# Patient Record
Sex: FEMALE | Race: BLACK OR AFRICAN AMERICAN | Employment: STUDENT | URBAN - METROPOLITAN AREA
[De-identification: names, ages, dates, MRNs, and addresses within clinical notes are randomized per-mention and may not be internally consistent; named-entity substitution may affect disease eponyms.]

---

## 2023-10-27 ENCOUNTER — OFFICE VISIT (OUTPATIENT)
Dept: SURGERY | Facility: CLINIC | Age: 23
End: 2023-10-27

## 2023-10-27 VITALS
HEART RATE: 84 BPM | BODY MASS INDEX: 48.82 KG/M2 | HEIGHT: 64.96 IN | WEIGHT: 293 LBS | RESPIRATION RATE: 16 BRPM | DIASTOLIC BLOOD PRESSURE: 76 MMHG | SYSTOLIC BLOOD PRESSURE: 134 MMHG | TEMPERATURE: 98 F | OXYGEN SATURATION: 95 %

## 2023-10-27 DIAGNOSIS — L90.5 SCAR OF FEMALE BREAST: Primary | ICD-10-CM

## 2023-10-27 PROCEDURE — 3008F BODY MASS INDEX DOCD: CPT | Performed by: SURGERY

## 2023-10-27 PROCEDURE — 99202 OFFICE O/P NEW SF 15 MIN: CPT | Performed by: SURGERY

## 2023-10-27 PROCEDURE — 3078F DIAST BP <80 MM HG: CPT | Performed by: SURGERY

## 2023-10-27 PROCEDURE — 3075F SYST BP GE 130 - 139MM HG: CPT | Performed by: SURGERY

## 2023-11-08 ENCOUNTER — OFFICE VISIT (OUTPATIENT)
Dept: OCCUPATIONAL MEDICINE | Age: 23
End: 2023-11-08
Attending: FAMILY MEDICINE

## 2023-11-10 ENCOUNTER — OFFICE VISIT (OUTPATIENT)
Dept: OCCUPATIONAL MEDICINE | Age: 23
End: 2023-11-10
Attending: PHYSICIAN ASSISTANT

## 2023-11-15 ENCOUNTER — OFFICE VISIT (OUTPATIENT)
Dept: OCCUPATIONAL MEDICINE | Age: 23
End: 2023-11-15
Attending: FAMILY MEDICINE

## 2023-11-17 ENCOUNTER — OFFICE VISIT (OUTPATIENT)
Dept: OCCUPATIONAL MEDICINE | Age: 23
End: 2023-11-17
Attending: PHYSICIAN ASSISTANT

## 2025-06-19 ENCOUNTER — OFFICE VISIT (OUTPATIENT)
Dept: NEUROLOGY | Facility: CLINIC | Age: 25
End: 2025-06-19
Payer: MEDICAID

## 2025-06-19 VITALS
WEIGHT: 293 LBS | HEART RATE: 104 BPM | BODY MASS INDEX: 51 KG/M2 | RESPIRATION RATE: 16 BRPM | DIASTOLIC BLOOD PRESSURE: 74 MMHG | SYSTOLIC BLOOD PRESSURE: 114 MMHG

## 2025-06-19 DIAGNOSIS — R51.9 NONINTRACTABLE HEADACHE, UNSPECIFIED CHRONICITY PATTERN, UNSPECIFIED HEADACHE TYPE: Primary | ICD-10-CM

## 2025-06-19 DIAGNOSIS — R25.9 ABNORMAL INVOLUNTARY MOVEMENTS: ICD-10-CM

## 2025-06-19 PROCEDURE — 99204 OFFICE O/P NEW MOD 45 MIN: CPT | Performed by: OTHER

## 2025-06-19 RX ORDER — ARIPIPRAZOLE 5 MG/1
5 TABLET ORAL DAILY
COMMUNITY
Start: 2025-06-18

## 2025-06-19 NOTE — PATIENT INSTRUCTIONS
Instructions from Dr. Vale:       Magnesium oxide, or Magnesium gluconate, or Magnesium glycinate, 400-500 mg a day should be taken every night, whether you have headache or not, for prevention purposes.  This needs to be taken for a minimum of 2 months to be effective.  You will know if it is working when the headaches are half as often, or half as severe.  In some people this can loosen the stools, but overall it is well tolerated.  (Magnesium citrate should be avoided - it usually causes more loose stools.)    Riboflavin (Vitamin B2) 100-200 mg a day, should be taken every day, whether you have headache or not, for prevention purposes.  This needs to be taken for a minimum of 2 months to be effective.  You will know if it is working when the headaches are half as often, or half as severe.   In some people this can turn body fluids bright yellow, this is not dangerous but can be noticeable, can stain clothes (sweat, urine) or contact lenses (tears).    I don't support a specific brand, but some patients have mentioned that Excedrin makes a vitamin, in a white and green box, that contains both Magnesium and Riboflavin the above supplements (NOT the excedrin migraine acute treatment).         ~~~~~~~~~~~~~~~~~~~~~~~     Refill policies:     Contact your pharmacy at least 5 days prior to running out of medication and have them send an electronic request or submit request through the “request refill” option in your [facility] account.  Allow 3-5 business days for refills; controlled substances may take longer.  If your prescription is due for a refill, please make sure you have a follow up appointment scheduled with the appropriate prescribing physician.  To best provide you care, patients receiving routine medications need to be seen at least once a year.  Patients receiving narcotic/controlled substance medications need to be seen at least once every 3 months.  Patients receiving narcotic/controlled substance  medications will be required to sign an Opioid Treatment Agreement/Contract.  Refills will not be refilled on weekends or holidays; on-call physicians will not refill routine medications.  No narcotics or controlled substances are refilled after noon on Fridays or by on-call physicians.  Federal Law states narcotics must be electronically prescribed.  A 30-day supply with no refills is the maximum allowed by law.  In the event that your preferred pharmacy does not have the requested medication in stock (e.g., Backordered), it is your responsibility to find another pharmacy that has the requested medication available.  We will gladly send a new prescription to that pharmacy at your request.

## 2025-06-19 NOTE — PROGRESS NOTES
Neurology History & Physical     ASSESSMENT & PLAN:      ICD-10-CM    1. Nonintractable headache, unspecified chronicity pattern, unspecified headache type  R51.9 MRI BRAIN (W+WO) (CPT=70553)      2. Abnormal involuntary movements  R25.9 MRI BRAIN (W+WO) (CPT=70553)        Headaches + involuntary movements (seems more an internal perception rather than an objective / visible movement or tremor).  Obtain MRI brain, start Magnesium prophylaxis.    We discussed medication side effects and activity precautions. We discussed symptoms that would warrant urgent/emergent evaluation. They verbalized understanding and agreement.    Return in about 3 months (around 9/19/2025).       ~~~~~~~~~~~~~~~~~~~~~~~~~~~    CHIEF COMPLAINT / REASON FOR VISIT:    Chief Complaint   Patient presents with    Neurologic Problem     New patient for migraines, muscle spasms in bilateral legs, and Full body tremors. 2-3 years.     HISTORY OBTAINED FROM:  Patient and others as above  Chart review    HISTORY:  Pricila Ordaz is a 25 year old female with schizophrenia (recently stopped Invega, about to start abilify), having headaches as well as muscle spasms / tremors in both legs starting in 2022 (at the same time).  Headache is a holocephalic, wrap around tightness, that is constant and daily for 3 years.  Currently takes advil 1-2 days a week, does not work.  She reports tremors that start in the back of the neck, travels down through the arms and legs.  This is generally internal and not visible (was only visible when she was on lexapro, but no longer).  No limb pain. + p/p/n, no emesis    No persistent limb numbness, tingling.  No myalgias or cramps.  Has BLE weakness (give out intermittently throughout the day).  No vision or hearing changes, though sometimes she cannot focus during headaches.  No tinnitus or pulsatile symptoms.  + dizziness, mild imbalance but no falls.    No positional component, no valsalva/exertional component, no diurnal  component to headache.   No back or neck pain.    No bowel or bladder issues.    Previous medication trials:  GBP  Maybe sumatriptan, pt not sure  Propranolol    DATA REVIEWED:  As documented in the history    June 2025  Psych note (Dr. Laina Morrison)     May 2025  CBC Hb 11.3  CMP unremarkable   Head CT unremarkable     Jan 2025  A1c, TSH unremarkable     PHYSICAL EXAMINATION:  /74   Pulse 104   Resp 16   Wt (!) 305 lb (138.3 kg)   BMI 50.82 kg/m²     Gen: in NAD  MSE: AAOx3, nl language, nl attn/conc, nl fund of knowledge  CN: PERRL, VFF; EOMI, no nystagmus; nl facial mvmt bilaterally; nl hearing bilaterally; nl palate elevation bilaterally; nl voice; nl shoulder shrug b/I; nl tongue movement  Motor: 5/5 x4; no drift; normal tone; no abnormal movements  Sensory: nl vibration x4  Coord: nl FTN b/I  Reflex: 2+ BUE and BLE  Gait: normal    Allergies   Allergen Reactions    Coconut Oil Tightness in Throat    Escitalopram FACE FLUSHING and UNKNOWN     [place]   Flushed  And  Lightheaded       Current medications:  Current Outpatient Medications on File Prior to Visit   Medication Sig Dispense Refill    ARIPiprazole 5 MG Oral Tab Take 1 tablet (5 mg total) by mouth daily.       No current facility-administered medications on file prior to visit.        History reviewed. No pertinent past medical history.    Past Surgical History:   Procedure Laterality Date    Cyst removal  2021       Social History     Socioeconomic History    Marital status: Single   Tobacco Use    Smoking status: Never    Smokeless tobacco: Never   Vaping Use    Vaping status: Never Used   Substance and Sexual Activity    Alcohol use: Never    Drug use: Never   Other Topics Concern    Caffeine Concern No    Exercise No       Family History   Problem Relation Age of Onset    Diabetes Father     Hypertension Mother        Gallito Vale MD, FAES, FAAN  Board-Certified in Neurology, Epilepsy, and Clinical Neurophysiology  [place]  [facility]